# Patient Record
Sex: MALE
[De-identification: names, ages, dates, MRNs, and addresses within clinical notes are randomized per-mention and may not be internally consistent; named-entity substitution may affect disease eponyms.]

---
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
## 2020-08-05 ENCOUNTER — HOSPITAL ENCOUNTER (EMERGENCY)
Dept: HOSPITAL 97 - ER | Age: 20
Discharge: HOME | End: 2020-08-05
Payer: COMMERCIAL

## 2020-08-05 VITALS — OXYGEN SATURATION: 97 % | DIASTOLIC BLOOD PRESSURE: 56 MMHG | SYSTOLIC BLOOD PRESSURE: 96 MMHG

## 2020-08-05 VITALS — TEMPERATURE: 98.5 F

## 2020-08-05 DIAGNOSIS — Z88.1: ICD-10-CM

## 2020-08-05 DIAGNOSIS — G89.18: Primary | ICD-10-CM

## 2020-08-05 DIAGNOSIS — Z98.890: ICD-10-CM

## 2020-08-05 PROCEDURE — 96372 THER/PROPH/DIAG INJ SC/IM: CPT

## 2020-08-05 PROCEDURE — 99283 EMERGENCY DEPT VISIT LOW MDM: CPT

## 2020-08-05 NOTE — XMS REPORT
Continuity of Care Document

                            Created on:2020



Patient:ABA KRAUSE

Sex:Male

:2000

External Reference #:[id number]





Demographics







                          Address                   [address] [place] [id number]

 

                          Home Phone                [phone]

 

                          Preferred Language        en-US

 

                          Marital Status            Unknown

 

                          Anglican Affiliation     Unknown

 

                          Race                      Unknown

 

                          Ethnic Group              Unknown









Author







                          Organization              [employer]









Care Team Providers







                    Name                Role                Phone

 

                    [employer] [facility] Unavailable         Un

available









Problems







          Problem   Status    Onset     Classification Date      Comments  Sourc

e



                              Date                Reported            



                                                                      



                                                                      

 

          RIGHT BICEP Active                                   [facility]



                              9                                       [facility]

r



                                                                      [place]



                                                                      

 

          RT        Active                                   [facility]



          KNEE/HIP/CONNOR           9                                       [place]

d



          D                                                           \[facility]\""ve

r



                                                                      [place]



                                                                      







Medications







                                        No Data Provided for This Section







Allergies, Adverse Reactions, Alerts







                                        No Known Medication Allergies







Immunizations







                                        No Data Provided for This Section







Results







                                        No Data Provided for This Section







Pathology Reports







                                        No Data Provided for This Section







Diagnostic Reports







                                        No Data Provided for This Section







Consultation Notes







                                        No Data Provided for This Section







Discharge Summaries







                                        No Data Provided for This Section







History and Physicals







                                        No Data Provided for This Section







Vital Signs







                                        No Data Provided for This Section







Encounters







       Location Location Encounter Encounter Reason Attending ADM    DC     Stat

 Source



              Details Type   Number For    Provider Date   Date          



                                   Visit                              



                                                                      



                                                                      



                                                                      

 

       [facility]           OP Therapy [id number]        Jose Francisco Maverick      

    [facility]        Patients                      /2019         [facility]



       [place]                                                           [place]



                                                                      



                                                                      

 

       [facility]           OP Therapy [id number]        Jose Francisco Maverick 09/16  10/16     

    [facility]        Patients                      /2019         [facility]                                                           [place]



                                                                      



                                                                      







Procedures







                                        No Data Provided for This Section







Assessment and Plan







                                        No Data Provided for This Section







Plan of Care







                                        No Data Provided for This Section







Social History







                    Social History      Date                Source



                                                            

 

                    No data available for this 10/16/2019          [facility]







Family History







                                        No Data Provided for This Section







Advance Directives







                                        No Data Provided for This Section







Functional Status







                                        No Data Provided for This Section

## 2020-08-05 NOTE — XMS REPORT
Continuity of Care Document

                            Created on:2020



Patient:ABA KRAUSE

Sex:Male

:2000

External Reference #:[id number]





Demographics







                          Address                   [address] [place] [address]

 

                          Home Phone                [phone]

 

                          Work Phone                [phone]

 

                          Email Address             [email or web address]

 

                          Preferred Language        en

 

                          Marital Status            Unknown

 

                          Latter-day Affiliation     Unknown

 

                          Race                      Unknown

 

                          Additional Race(s)        Unavailable



                                                    Unavailable



                                                    Other



                                                    Unavailable

 

                          Ethnic Group              Unknown









Author







                          Organization              [facility]

t

 

                          Address                   [address]



                                                    [place] [address]

 

                          Phone                     [phone]









Support







                Name            Relationship    Address         Phone

 

                ABA KRAUSE   Unavailable     .               [phone]



                                                .               



                                                [place] [address] 

 

                ABA KRAUSE   Unavailable     .               [phone]



                                                .               



                                                [place] [address] 

 

                JACOB KRAUSE  Unavailable     [address] [facility]    [phone]



                                                [place] [address] 









Care Team Providers







                    Name                Role                Phone

 

                    ASHER TEMPLE M.D.   Attending Clinician Unavailable

 

                    Asher Temple Attending Clinician [phone]

 

                    DR RAMYA         Attending Clinician Unavailable

 

                    DR RAMYA         Admitting Clinician Unavailable









Payers







           Payer Name Policy Type Policy Number Effective Date Expiration Date S

ource







Problems







       Condition Condition Condition Status Onset  Resolution Last   Treating Co

mments 

Source



       Name   Details Category        Date   Date   Treatment Clinician        



                                                 Date                 

 

       RIGHT         Diagnosis Active 2019               Mem

[facility]



       BICEP                       9-03          16:13:00               l



                RIGHT               08:00:                             [facility]



              BICEP                00                                 



                                                                      



                                                                      



              Active                                                  



                                                                      



                                                                      



              2019                                                  



                                                                      



                                                                      



                                                                      



                                                                      



                                                                      



                                                                      



                                                                      



                                                                      



                     [facility]                                                    



                                                                      



                                                                      

 

       RT            Diagnosis Active 2019               Mem

oria



       KNEE/HIP/Q                      -29          15:01:00               l



       [facility]      RT                 16:12:                             [place]



              KNEE/HIP/Q               00                                 



              [facility]                                                     



                                                                      



                                                                      



              Active                                                  



                                                                      



                                                                      



              2019                                                  



                                                                      



                                                                      



                                                                      



                                                                      



                                                                      



                                                                      



                                                                      



                                                                      



                     [facility]                                                    



                                                                      



                                                                      

 

       Biceps Biceps Problem Active                                    [facility]



       tendinitis tendinitis                                                  it

y of



       of right of right                                                  [place]



       upper  upper                                                   Physici



       extremity extremity                                                  ans

 

       Right  Right  Problem Active                                    [facility]



       elbow pain elbow pain                                                  it

y of



                                                                      [place]



                                                                      Physici



                                                                      ans







Allergies, Adverse Reactions, Alerts







       Allergy Allergy Status Severity Reaction(s) Onset  Inactive Treating Comm

ents 

Source



       Name   Type                        Date   Date   Clinician        

 

       No Known DA     Active U                                   [facility]



       Allergie                                                     [place]



       s                                  00:00:                      d



                                          00                          Medical



                                                                      Center

 

       amoxicil DA     Active SV            -                      [facility]



       dheeraj                                6-25                        [place]



                                          00:00:                      d



                                          00                          Medical



                                                                      Center







Social History







           Social Habit Start Date Stop Date  Quantity   Comments   Source

 

           Social History 2019-10-16 2019-10-16                       [facility] SHANTI valladares



                      04:59:00   04:59:00                         







Medications







       Ordered Filled Start  Stop   Current Ordering Indication Dosage Frequency

 Signature

                    Comments            Components          Source



     Medication Medication Date Date Medication? Clinician                (SIG) 

          



     Name Name                                                   

 

     methylPREDN methylPREDN       Yes  ASHER TEMPLE                Take as 

          [facility]



     ISolone 4 ISolone 4 8-29           M.D.                direct on           

ity of



     MG Oral MG Oral 00:00:                               the            [place]



     Tablet Tablet 00                                 package.           Physici



     Therapy Therapy                                    QTY 1 x 21           ans



     Pack Pack                                    tablet           



                                                  pack           

 

     Diclofenac Diclofenac       Yes  ASHER TEMPLE           QD   APPLY TO  

         [facility]



     Sodium 1 % Sodium 1 % 1-24           M.D.                LOWER           it

y of



     Transdermal Transdermal 00:00:                               EXTREMITIE    

       [place]



     Gel  Gel  00                                 S, 4 GM OF           Physici



                                                  GEL TO           ans



                                                  AFFECTED           



                                                  AREA 4           



                                                  TIMES           



                                                  DAILY.  DO           



                                                  NOT APPLY           



                                                  MORE THAN           



                                                  16 GM           



                                                  DAILY TO           



                                                  ANY ONE           



                                                  AFFECTED           



                                                  JOINT.           

 

     Meloxicam Meloxicam       Yes  ASHER TEMPLE      1         TAKE 1      

     [facility]



     15 MG Oral 15 MG Oral 1-24           M.D.                TABLET           i

ty of



     Tablet Tablet 00:00:                               DAILY AS           [place]



               00                                 NEEDED.           Physici



                                                                 ans







Immunizations







           Ordered Immunization Filled Immunization Date       Status     Commen

ts   Source



           Name       Name                                        

 

           Varivax 1350            2013 Completed             University o

f



           PFU/0.5ML             00:00:00                         [place] [facility]

ns



           Subcutaneous                                             



           Injectable                                             

 

           Boostrix 5-2.5-18.5            2013 Completed             Unive

rsity of



           Intramuscular            00:00:00                         [place] [facility]

cians



           Suspension                                             

 

           Meningococcal, MCV4,            2013 Completed             Univ

ersity of



           unspecified            00:00:00                         [place] [facility]

ans



           conjugate                                              



           formulation(groups                                             



           A, C, Y and W-135)                                             

 

           Hepatitis B,            2000 Completed             University o

f



           pediatric/adolescent            00:00:00                         [place]

[facility]



           dosage                                                 







Procedures







                Procedure       Date / Time Performed Performing Clinician Sourc

e

 

                [U] XRAY HIP    2020 00:00:00                 [facility]

f [place]



                UNILATERAL MIN 2 VWS                                 Physicians



                RIGHT [id number]                                     

 

                [U] XRAY ELBOW MIN 3 2019 00:00:00                 [facility]

ity of [place]



                VWS RIGHT [id number]                                 Physicians

 

                [U] XRAY SHOULDER MIN 2019 00:00:00                 Univer

[facility] [place]



                2 VWS RIGHT [address]                                 Physicians

 

                [U] XRAY HIP    2019 00:00:00                 [facility]



                UNILATERAL MIN 2 VWS                                 Physicians



                RIGHT [id number]                                     







Encounters







        Start   End     Encounter Admission Attending Care    Care    Encounter 

Source



        Date/Time Date/Time Type    Type    Clinicians Facility Department ID   

   

 

        2020 Appointmen         ABRIL TEMPLE     Orthopedics [id number]

[id number] [facility]



        09:45:00 09:45:00 t; ASHER TEMPLE M.D.         - Guaynabosushil Lopez M.D.                                         [place]



                                                                        Physici



                                                                        ans

 

        2019 Appointmen         ABRIL TEMPLE     [facility]     [id number]

2 Univers



        15:45:00 15:45:00 t; ASHER TEMPLE M.D. i ty of EVAN, M.D.                                         [place]



                                                                        Physici



                                                                        ans

 

        2019 Appointmen         ABRIL TEMPLE     Orthopedics 589

[id number] [facility]



        12:45:00 12:45:00 t; ASHER TEMPLE M.D.         - [place]ayn[place]ushil Lopez M.D.                                         [place]



                                                                        Physici



                                                                        ans

 

        2019-10-17 2019-10-17 Appointmen         ABRIL TEMPLE     [facility]     [id number]

8 Univers



        15:45:00 15:45:00 t; ASHER TEMPLE M.D. i ty of EVAN, M.D.                                         [place]



                                                                        Physici



                                                                        ans

 

        2019 2019-10-15 Outpatient         Asher Temple [id number]. [id number].

1. [id number] 



        15:50:00 23:59:00                 [place] [id number]    

  



                                                [phone] [phone]            

 

        2019 Appointmen         ABRIL TEMPLE     Orthopedics 562

[id number] [facility]



        14:45:00 14:45:00 t; ASHER TEMPLE M.D.         - Claire Lopez M.D.                                         [place]



                                                                        Physici



                                                                        ans

 

        2019 Appointmen         ABRIL TEMPLE     Orthopedics 515

[id number] [facility]



        16:00:00 16:00:00 t; ASHER TEMPLE M.D.         at [facility]



                        SIMEON STERLING                                         [facility]

 

        2019 Appointmen         NATHALIA  [facility]     [id number]

9 Univers



        14:45:00 14:45:00 t; ASHER TEMPLE M.D.                         i

ty of



                        SIMEON STERLING                                         [facility]

 

        2019 Outpatient         Asher Temple [id number]. [id number].

1. [id number] 



        16:00:00 23:59:00                 [place] [id number]    

  



                                                [phone] 5.57            

 

        2019 Appointmen         ABRIL TEMPLE     Orthopedics 489

[address] [facility]



        15:30:00 15:30:00 t; ASHER TEMPLE M.D.         at [facility]



                        SIMEON STERLING                                         [facility]

 

        2018-10-18 2018-10-18 Outpatient MAICOL BUI [facility]     TRAVISASC 1000

[id number] [place]



        04:34:00 07:15:00                 [facility]







Results







           Test Description Test Time  Test Comments Results    Result     [facility]

e



                                                       Comments   

 

           [U] XRAY   2019             Images acquired,            Universi

ty of



           SHOULDER MIN 2 9                     not reported on            [facility] RIGHT [id number] 15:17:00              this accession            Physi

cians



                                            number.               

 

           SURG       2019             -------------------            



                      8                     -------------------            



                      16:21:00              -------------------            



                                            -------------------            



                                            ----------------RUN            



                                            DATE: 19            



                                                    [facility] -            



                                            LAB *LIVE*            



                                                 PAGE 1   RUN            



                                            TIME: 1621            



                                                                  



                                            Specimen Inquiry            



                                                            RUN            



                                            USER: INTERFACE            



                                                                  



                                                                  



                                                                  



                                            -------------------            



                                            -------------------            



                                            -------------------            



                                            -------------------            



                                            ----------------JYOTSNA            



                                            IENT: ABA KRAUSE            



                                                                  



                                            ACCT #:               



                                            [id number] LOC:            



                                            L.2S       U #:            



                                            [id number]            



                                                                  



                                                     AGE/SX:            



                                            18/M         ROOM:            



                                            [facility] [place]     RE/27/19[facility] DR:            



                                            Renato Coronel III            



                                             :    00            



                                              BED:  1             



                                            DIS:                  



                                                                  



                                                        STATUS:            



                                            ADM Nikolas      TLOC:            



                                                                  



                                            -------------------            



                                            -------------------            



                                            -------------------            



                                            -------------------            



                                            ----------------            



                                            SPEC #:               



                                            [id number]            



                                            RECD:             



                                                STATUS:  SHARLA            



                                                    REQ #:            



                                            [id number]              



                                                                  



                                            KAMAR:             



                                                [facility] DR:            



                                            Renato Coronel III, MD            



                                              ENTERED:            



                                                SP            



                                            TYPE: SURG            



                                             OTHR DR:             



                                            Kel Cortes MD            



                                                      ORDERED:            



                                            SURG PATH LVL 1,            



                                            SURG PATH LVL 3/3            



                                                                  



                                                                  



                                                  COPIES TO:            



                                            Kel Cortes MD            



                                             [address]            



                                            [place], [place]            



                                            [address] [phone]            



                                            Renato Coronel III, MD            



                                            [address] [facility]   [address] [place] [address]            



                                             [phone]            



                                            HISTOLOGY:   TISSUE            



                                                     ID    BLK            



                                             PCS MAR[place] LEV            



                                            PROCEDURE             



                                            DISPOSITION            



                                            _______________            



                                            ____ ______ ___ ___            



                                            ___ _______________            



                                            ___________            



                                            TONSIL, NOS     A            



                                               1              1            



                                                                  



                                            TONSIL, NOS     B            



                                               1              1            



                                                                  



                                            NASAL SEPTUM, N C            



                                               1              1            



                                                                  



                                            NASAL TURBINATE D            



                                               1              1            



                                                                  



                                            PROCEDURES: SURG            



                                            PATH LVL 1            



                                            ()            



                                                   SURG PATH            



                                            LVL 3                 



                                            ()            



                                            TISSUES:              



                                            A. TONSIL, NOS -            



                                            RIGHT TONSIL            



                                             B. TONSIL, NOS -            



                                            LEFT TONSIL            



                                            C. NASAL SEPTUM,            



                                            NOS - SEPTUM            



                                             D. NASAL             



                                            TURBINATE, NOS -            



                                            TURBINATES,            



                                            SUBMUCOUS             



                                            CLINICAL HISTORY            



                                            TONSIL HYPERTROPHY            



                                            -J35.1; NASAL            



                                            SEPTAL DEFORMITY            



                                            -J34.2                



                                            CPT CODES    CPT            



                                            CODE(S): 88304X3            



                                            , 57826     ,            



                                                 ,           ,            



                                                     ,            



                                              ,         FINAL            



                                            DIAGNOSIS    A.            



                                            Tonsil, right,            



                                            tonsillectomy:            



                                             CHRONIC              



                                            TONSILLITIS            



                                                                  



                                                   ** CONTINUED            



                                            ON NEXT PAGE **            



                                            -------------------            



                                            -------------------            



                                            -------------------            



                                            -------------------            



                                            ----------------RUN            



                                            DATE: 19            



                                                    [facility] -            



                                            LAB *LIVE*            



                                                 PAGE 2   RUN            



                                            TIME: 1621            



                                                                  



                                            Specimen Inquiry            



                                                            RUN            



                                            USER: INTERFACE            



                                                                  



                                                                  



                                                                  



                                            -------------------            



                                            -------------------            



                                            -------------------            



                                            -------------------            



                                            ----------------SHAHBAZ MILIAN #: PMC:[id number]            



                                               PATIENT:            



                                            ABA KRAUSE            



                                                                  



                                            #[id number]            



                                            (Continued)--------            



                                            -------------------            



                                            -------------------            



                                            -------------------            



                                            -------------------            



                                            --------              



                                            FINAL DIAGNOSIS            



                                                  (Continued)            



                                             B.  Tonsil, left,            



                                            tonsillectomy:            



                                             CHRONIC              



                                            TONSILLITIS            



                                            C.  Nasal septum,            



                                            septoplasty:            



                                            UNREMARKABLE            



                                            FRAGMENTS OF BONE            



                                            AND CARTILAGE            



                                            (GROSS DIAGNOSIS            



                                            ONLY)       D.            



                                            Turbinates,            



                                            excision:             



                                            MILD CHRONIC            



                                            INFLAMMATION            



                                            GROSS DESCRIPTION            



                                             A.  Right tonsil.            



                                            Received in            



                                            formalin is a            



                                            fragment of            



                                            tan-brown soft            



                                            tissue,   partially            



                                            covered by pink-tan            



                                            mucosa, 2.5 x 1.7 x            



                                            0.8 cm.  The cut            



                                            surface is   soft,            



                                            pink-tan with            



                                            crypts.  No gross            



                                            lesion is             



                                            identified.            



                                            Representative            



                                            section submitted            



                                            as A.        B.            



                                            Left tonsil.            



                                            Received in            



                                            formalin is a            



                                            fragment of            



                                            tan-brown soft            



                                            tissue,   partially            



                                            covered by pink-tan            



                                            mucosa, 3.0 x 2.0 x            



                                            0.8 cm.  The cut            



                                            surface is   soft,            



                                            pink-tan with            



                                            crypts.  No gross            



                                            lesion is             



                                            identified.            



                                            Representative            



                                            section submitted            



                                            as B.       C.            



                                            Septum.  Received            



                                            in formalin are            



                                            multiple irregular            



                                            fragments of            



                                            cartilage   and            



                                            bones, 3.5 x 3.0 x            



                                            0.5 cm in             



                                            aggregate.  The            



                                            specimen is for            



                                            gross                 



                                            identification only            



                                            and is                



                                            photographed.            



                                            D.  Turbinates.            



                                            Received in            



                                            formalin are            



                                            irregular fragments            



                                            of tan-brown soft            



                                            tissue admixed with            



                                            dark brown blood            



                                            clots, 2.5 x 1.0 x            



                                            0.3 cm in             



                                            aggregate.            



                                            Representative            



                                            sections submitted            



                                            as D.  ba/nr            



                                            Grossing performed            



                                            at [facility],            



                                            [address] [place] [address], [address]            



                                            [address],    [place] [address].            



                                            Medical Director:            



                                            Lucas Lorenz M.D.                  



                                            MICROSCOPIC            



                                            DESCRIPTION    A.            



                                            Right tonsil.            



                                            Sections              



                                            demonstrate            



                                            tonsillar tissue            



                                            with lymphoid            



                                            hyperplasia.            



                                            Associated squamous            



                                            mucosa demonstrates            



                                            acute and chronic            



                                            inflammation.  No            



                                            dysplasia or            



                                            malignancy is            



                                            identified.            



                                            B.  Left tonsil.            



                                            Sections              



                                            demonstrate            



                                            tonsillar tissue            



                                            with lymphoid            



                                            hyperplasia.            



                                            Associated squamous            



                                            mucosa demonstrates            



                                            acute and chronic            



                                            inflammation.  No            



                                            dysplasia or            



                                            malignancy is            



                                            identified.            



                                            C.  Septum. No            



                                            sections submitted.            



                                             Gross diagnosis            



                                            only.       D.            



                                            Turbinates.            



                                            Sections              



                                            demonstrate            



                                            respiratory mucosa            



                                            with underlying            



                                            glands   and mild            



                                            chronic               



                                            inflammation.  No            



                                            dysplasia or            



                                            malignancy is            



                                            identified.            



                                                                  



                                               ** CONTINUED ON            



                                            NEXT PAGE **            



                                            -------------------            



                                            -------------------            



                                            -------------------            



                                            -------------------            



                                            ----------------RUN            



                                            DATE: 19            



                                                    [facility] -            



                                            LAB *LIVE*            



                                                 PAGE 3   RUN            



                                            TIME: 1621            



                                                                  



                                            Specimen Inquiry            



                                                            RUN            



                                            USER: INTERFACE            



                                                                  



                                                                  



                                                                  



                                            -------------------            



                                            -------------------            



                                            -------------------            



                                            -------------------            



                                            ----------------SHAHBAZ MILIAN #: PMC:[id number]            



                                               PATIENT:            



                                            ABA KRAUSE            



                                                                  



                                            #[id number]            



                                            (Continued)--------            



                                            -------------------            



                                            -------------------            



                                            -------------------            



                                            -------------------            



                                            --------              



                                            -------------------            



                                            -------------------            



                                            -------------------            



                                            -------------------            



                                            ----------------            



                                            Signed SIGNATURE ON            



                                            FILE                  



                                                                  



                                            Geovanni Harvey JEFF            



                                            19 1621            



                                            -------------------            



                                            -------------------            



                                            -------------------            



                                            -------------------            



                                            ----------------            



                                                                  



                                                                  



                                                                  



                                                             **            



                                            END OF REPORT **            









                    PROTHROMBIN TIME    2019 06:59:00 









                      Test Item  Value      Reference Range Interpretation Anabela amezcua









             PT PATIENT (test code = PTP) 12.1 SECONDS 9.3-12.9     N           

 

 

             [employer]ATIO (test code = INR) 1.05 INR Unit 0.8-1.2 

     N            



THROMBOPLASTIN TIME [id number] 06:59:00





             Test Item    Value        Reference Range Interpretation Comments

 

             THROMBOPLASTIN TIME PARTIAL 30.4 SECONDS 26-35        N            



             (test code = PTT)                                        



CBC W/AUTO [id number] 06:20:00





             Test Item    Value        Reference Range Interpretation Comments

 

             WHITE BLOOD CELL (test code = 5.5 K/mm3    3.5-11.0     N          

  



             WBC)                                                

 

             RED BLOOD CELL (test code = RBC) 4.71 M/mm3   4.70-6.10    N       

     

 

             HEMOGLOBIN (test code = HGB) 15.3 G/DL    12.3-15.9    N           

 

 

             HEMATOCRIT (test code = HCT) 43.2 %       35.8-46.7    N           

 

 

             MEAN CELL VOLUME (test code = 91.7 Fl      86.3-98.9    N          

  



             MCV)                                                

 

             MEAN CELL HGB (test code = MCH) 32.5 pg      28.9-34.4    N        

    

 

             MEAN CELL HGB CONCETRATION (test 35.4 G/DL    32.1-34.5    H       

     



             code = MCHC)                                        

 

             RED CELL DISTRIBUTION WIDTH (test 12.8 SD      11.5-14.5    N      

      



             code = RDW)                                         

 

             PLATELET COUNT (test code = PLT) 223.0 K/mm3  150-450      N       

     

 

             MEAN PLATELET VOLUME (test code = 10.10 fL     7.0-9.6      H      

      



             MPV)                                                

 

             NEUTROPHIL % (test code = NT%) 40.2 %       24.0-85.0    N         

   

 

             LYMPHOCYTE % (test code = LY%) 44.6 %       20.5-51.1    N         

   

 

             MONOCYTE % (test code = MO%) 12.1 %       1.7-9.3      H           

 

 

             EOSINOPHIL % (test code = EO%) 2.0 %        0.0-6.0      N         

   

 

             BASOPHIL % (test code = BA%) 1.1 %        0.0-2.0      N           

 

 

             NEUTROPHIL # (test code = NT#) 2.23 K/mm3   1.8-7.6      N         

   

 

             LYMPHOCYTE # (test code = LY#) 2.5 K/mm3    0.6-3.0      N         

   

 

             MONOCYTE # (test code = MO#) 0.7 K/mm3    0.2-1.5      N           

 

 

             EOSINOPHIL # (test code = EO#) 0.1 K/mm3    0.0-0.4      N         

   

 

             BASOPHIL # (test code = BA#) 0.1 K/mm3    0.0-0.2      N           

 

 

             MANUAL DIFF REQUIRED (test code = NO DIFF/SCN  CRITERIA            

      



             MDIFF)                                              



[U] XRAY KNEE 1 OR 2 VWS RIGHT [id number] 15:21:00Images acquired, not 
reported on this accession number.[facility]

## 2020-08-05 NOTE — EDPHYS
Physician Documentation                                                                           

 [facility]                                                                 

Name: Qamar Lopez IV                                                                             

Age: 19 yrs                                                                                       

Sex: Male                                                                                         

: 2000                                                                                   

MRN: [id number]                                                                                   

Arrival Date: 2020                                                                          

Time: 00:59                                                                                       

Account#: [id number]                                                                            

Bed 15                                                                                            

Private MD: Kel Cortes ED Physician David England                                                                         

HPI:                                                                                              

                                                                                             

01:48 This 19 yrs old  Male presents to ER via Ambulatory with complaints of Post     jr8 

      Surgical Pain.                                                                              

01:48 The complaints affect the right wrist diffusely. Onset: The symptoms/episode            jr8 

      began/occurred acutely, today. Modifying factors: The symptoms are alleviated by            

      nothing, the symptoms are aggravated by movement. Associated signs and symptoms: The        

      patient has no apparent associated signs or symptoms. The patient has not experienced       

      similar symptoms in the past. The patient has been recently seen by a physician:.           

      Patient had recent carpal tunnel release. Stated that today he had brace on wrong.          

      Started to have pain at surgical site that has now intensified and that his OTC             

      medicines and prescriptive medicines are not working .                                      

                                                                                                  

Historical:                                                                                       

- Allergies:                                                                                      

01:15 Amoxicillin;                                                                            mt2 

- Home Meds:                                                                                      

01:20 Lyrica Oral 1 cap 3 times per day [Active]; Wellbutrin 75 mg Oral tab 1 tab DAILY       mt2 

      [Active]; hydroxyzine HCl 25 mg Oral tab 1 tab PRN for Anxiety [Active];                    

      hydrocodone-acetaminophen 5-325 mg Oral tab 1 tab every 6 hours for Pain [Active];          

- PSHx:                                                                                           

01:15 Tonsillectomy;                                                                          mt2 

                                                                                                  

- Immunization history:: Adult Immunizations up to date.                                          

- Social history:: Smoking status: Patient denies any tobacco usage or history of.                

                                                                                                  

                                                                                                  

ROS:                                                                                              

01:48 Eyes: Negative for injury, pain, redness, and discharge, ENT: Negative for injury,      jr8 

      pain, and discharge, Neck: Negative for injury, pain, and swelling, Cardiovascular:         

      Negative for chest pain, palpitations, and edema, Respiratory: Negative for shortness       

      of breath, cough, wheezing, and pleuritic chest pain, Abdomen/GI: Negative for              

      abdominal pain, nausea, vomiting, diarrhea, and constipation, Back: Negative for injury     

      and pain, Skin: Negative for injury, rash, and discoloration, Neuro: Negative for           

      headache, weakness, numbness, tingling, and seizure.                                        

01:48 MS/extremity: Positive for pain, tenderness, of the right hand.                             

                                                                                                  

Exam:                                                                                             

01:48 Cardiovascular:  Regular rate and rhythm with a normal S1 and S2.  No gallops, murmurs, jr8 

      or rubs.  Normal PMI, no JVD.  No pulse deficits. Respiratory:  Lungs have equal breath     

      sounds bilaterally, clear to auscultation and percussion.  No rales, rhonchi or wheezes     

      noted.  No increased work of breathing, no retractions or nasal flaring. Skin:  Warm,       

      dry with normal turgor.  Normal color with no rashes, no lesions, and no evidence of        

      cellulitis. Neuro:  Awake and alert, GCS 15, oriented to person, place, time, and           

      situation.  Cranial nerves II-XII grossly intact.  Motor strength 5/5 in all                

      extremities.  Sensory grossly intact.  Cerebellar exam normal.  Normal gait.                

01:48 Constitutional: The patient appears alert, awake, anxious, in obvious pain,                 

      uncomfortable.                                                                              

01:48 Musculoskeletal/extremity: Extremities: grossly normal except: noted in the right           

      wrist/hand: Patient has approximately 2.5 cm surgical incision noted to right wrist         

      region from carpal tunnel release. Sutures in place. No dehiscence noted. No erythema       

      or discharge noted. Moderate pain to palpation , ROM: intact in all extremities,            

      Circulation is intact in all extremities. Sensation intact.                                 

                                                                                                  

Vital Signs:                                                                                      

01:11  / 70; Pulse 118; Resp 16; Temp 98.5; Pulse Ox 100% ; Weight 56.7 kg; Pain 10/10; mt2 

02:00 BP 96 / 56; Pulse 81; Resp 16; Pulse Ox 97% ; Pain 3/10;                                mt2 

                                                                                                  

MDM:                                                                                              

01:12 Patient medically screened.                                                             jr8 

01:48 Data reviewed: vital signs, nurses notes, and as a result, I will discharge patient.    jr8 

      Data interpreted: Pulse oximetry: on room air is 97 %. Interpretation: normal.              

      Counseling: I had a detailed discussion with the patient and/or guardian regarding: the     

      historical points, exam findings, and any diagnostic results supporting the                 

      discharge/admit diagnosis, the need for outpatient follow up, a orthopedic surgeon, to      

      return to the emergency department if symptoms worsen or persist or if there are any        

      questions or concerns that arise at home. ED course: Discussed with patient that he is      

      likely having some breakthrough pain. Patient feeling better. No other acute findings       

      on exam suggestive of infection or other post surgical complications that would warrant     

      emergent admission and consultation with orthopedics. Will have him f/u up with Dr. Farrar tomorrow. Patient good with plan .                                                  

                                                                                                  

Administered Medications:                                                                         

01:39 Drug: Zofran (Ondansetron) 4 mg Route: PO;                                              mt2 

02:00 Follow up: Response: No adverse reaction; Pain is decreased                             mt2 

01:39 Drug: TORadol - Ketorolac 15 mg Route: IM; Site: left deltoid;                          mt2 

02:00 Follow up: Response: No adverse reaction; Pain is decreased                             mt2 

01:39 Drug: Dilaudid 1 mg Route: IM; Site: left deltoid;                                      mt2 

02:00 Follow up: Response: No adverse reaction; Pain is decreased                             mt2 

                                                                                                  

                                                                                                  

Disposition:                                                                                      

03:10 Co-signature as Attending Physician, David England MD.                                    rn  

                                                                                                  

Disposition:                                                                                      

20 02:25 Discharged to Home. Impression: Other acute postprocedural pain.                   

- Condition is Stable.                                                                            

- Discharge Instructions: Pain Medicine Instructions.                                             

                                                                                                  

- Medication Reconciliation Form, Thank You Letter, Antibiotic Education, Prescription            

  Opioid Use form.                                                                                

- Follow up: Naif Arthur MD; When: Tomorrow; Reason: Recheck today's complaints,             

  Continuance of care, Re-evaluation by your physician.                                           

- Problem is new.                                                                                 

- Symptoms have improved.                                                                         

                                                                                                  

                                                                                                  

                                                                                                  

Signatures:                                                                                       

David England MD MD rn Roszak, Josh, PA PA   jr8                                                  

Jerri Espitia RN                    RN   mt2                                                  

                                                                                                  

Corrections: (The following items were deleted from the chart)                                    

02:33 02:25 2020 02:25 Discharged to Home. Impression: Other acute postprocedural pain. mt2 

      Condition is Stable. Forms are Medication Reconciliation Form, Thank You Letter,            

      Antibiotic Education, Prescription Opioid Use. Follow up: Naif Arthur; When:             

      Tomorrow; Reason: Recheck today's complaints, Continuance of care, Re-evaluation by         

      your physician. Problem is new. Symptoms have improved. jr8                                 

                                                                                                  

**************************************************************************************************

## 2020-08-05 NOTE — ER
Nurse's Notes                                                                                     

 [facility]                                                                 

Name: Qamar Lopez IV                                                                             

Age: 19 yrs                                                                                       

Sex: Male                                                                                         

: 2000                                                                                   

MRN: [id number]                                                                                   

Arrival Date: 2020                                                                          

Time: 00:59                                                                                       

Account#: [id number]                                                                            

Bed 15                                                                                            

Private MD: Kel Cortes                                                                         

Diagnosis: Other acute postprocedural pain                                                        

                                                                                                  

Presentation:                                                                                     

                                                                                             

01:11 Chief complaint: Patient states: PER PT S/P CARPAL TUNNEL SX ON RIGHT WRIST. PAIN HAS   mt2 

      INCREASED AND IT IS INTOLERABLE. NOS/S OF REDNESS OR SWELLING. Coronavirus screen:          

      Client denies travel out of the [place] in the last 14 days. At this time, the client does     

      not indicate any symptoms associated with coronavirus-19. Ebola Screen: No symptoms or      

      risks identified at this time. Initial Sepsis Screen: Does the patient meet any 2           

      criteria? No. Patient's initial sepsis screen is negative. Does the patient have a          

      suspected source of infection? No. Patient's initial sepsis screen is negative. Risk        

      Assessment: Do you want to hurt yourself or someone else?. Onset of symptoms was 2020.                                                                                   

01:11 Method Of Arrival: Ambulatory                                                           mt2 

01:11 Acuity: ANKITA 4                                                                           mt2 

                                                                                                  

Triage Assessment:                                                                                

01:15 General: Appears uncomfortable, Behavior is cooperative. Pain: Complains of pain in     mt2 

      right hand Pain currently is 10 out of 10 on a pain scale. Musculoskeletal: Denies          

      numbness in, right hand pain in, right hand.                                                

                                                                                                  

Historical:                                                                                       

- Allergies:                                                                                      

01:15 Amoxicillin;                                                                            mt2 

- Home Meds:                                                                                      

01:20 Lyrica Oral 1 cap 3 times per day [Active]; Wellbutrin 75 mg Oral tab 1 tab DAILY       mt2 

      [Active]; hydroxyzine HCl 25 mg Oral tab 1 tab PRN for Anxiety [Active];                    

      hydrocodone-acetaminophen 5-325 mg Oral tab 1 tab every 6 hours for Pain [Active];          

- PSHx:                                                                                           

01:15 Tonsillectomy;                                                                          mt2 

                                                                                                  

- Immunization history:: Adult Immunizations up to date.                                          

- Social history:: Smoking status: Patient denies any tobacco usage or history of.                

                                                                                                  

                                                                                                  

Screenin:16 Abuse screen: Denies threats or abuse. Nutritional screening: No deficits noted.        mt2 

      Tuberculosis screening: No symptoms or risk factors identified. Fall Risk None              

      identified.                                                                                 

                                                                                                  

Assessment:                                                                                       

02:00 Reassessment: Patient and/or family updated on plan of care and expected duration. Pain mt2 

      level reassessed. Patient is alert, oriented x 3, equal unlabored respirations, skin        

      warm/dry/pink. General: Appears comfortable, Behavior is cooperative. Pain: Complains       

      of pain in right hand Pain currently is 3 out of 10 on a pain scale. Quality of pain is     

      described as aching.                                                                        

                                                                                                  

Vital Signs:                                                                                      

01:11  / 70; Pulse 118; Resp 16; Temp 98.5; Pulse Ox 100% ; Weight 56.7 kg; Pain 10/10; mt2 

02:00 BP 96 / 56; Pulse 81; Resp 16; Pulse Ox 97% ; Pain 3/10;                                mt2 

                                                                                                  

ED Course:                                                                                        

00:59 Patient arrived in ED.                                                                  es  

01:00 Kel Cortes MD is Private Physician.                                                 es  

01:03 Jerri Espitia, RN is Primary Nurse.                                                  mt2 

01:12 [facility]                                                               jr8 

01:12 David England MD is Attending Physician.                                                jr8 

01:14 Triage completed.                                                                       mt2 

01:16 Arm band placed on right wrist.                                                         mt2 

01:16 Bed in low position. Call light in reach. Side rails up X 1.                            mt2 

02:24 Naif Arthur MD is Referral Physician.                                              jr8 

02:27 No provider procedures requiring assistance completed. Patient did not have IV access   mt2 

      during this emergency room visit.                                                           

                                                                                                  

Administered Medications:                                                                         

01:39 Drug: Zofran (Ondansetron) 4 mg Route: PO;                                              mt2 

02:00 Follow up: Response: No adverse reaction; Pain is decreased                             mt2 

01:39 Drug: TORadol - Ketorolac 15 mg Route: IM; Site: left deltoid;                          mt2 

02:00 Follow up: Response: No adverse reaction; Pain is decreased                             mt2 

01:39 Drug: Dilaudid 1 mg Route: IM; Site: left deltoid;                                      mt2 

02:00 Follow up: Response: No adverse reaction; Pain is decreased                             mt2 

                                                                                                  

                                                                                                  

Outcome:                                                                                          

02:25 Discharge ordered by MD.                                                                jr8 

02:27 Discharged to home ambulatory.                                                          mt2 

02:27 Condition: improved                                                                         

02:27 Discharge instructions given to patient, Instructed on discharge instructions, follow       

      up and referral plans. medication usage, Demonstrated understanding of instructions,        

      follow-up care, medications.                                                                

02:33 Patient left the ED.                                                                    mt2 

                                                                                                  

Signatures:                                                                                       

Olga Salas Josh, PA                        PA   jr8                                                  

Jerri Espitia RN                    RN   mt2                                                  

                                                                                                  

**************************************************************************************************

## 2024-08-01 ENCOUNTER — HOSPITAL ENCOUNTER (EMERGENCY)
Dept: HOSPITAL 97 - ER | Age: 24
LOS: 1 days | Discharge: HOME | End: 2024-08-02
Payer: COMMERCIAL

## 2024-08-01 DIAGNOSIS — F41.0: Primary | ICD-10-CM

## 2024-08-01 PROCEDURE — 71045 X-RAY EXAM CHEST 1 VIEW: CPT

## 2024-08-01 PROCEDURE — 96374 THER/PROPH/DIAG INJ IV PUSH: CPT

## 2024-08-01 PROCEDURE — 85025 COMPLETE CBC W/AUTO DIFF WBC: CPT

## 2024-08-01 PROCEDURE — 80048 BASIC METABOLIC PNL TOTAL CA: CPT

## 2024-08-01 PROCEDURE — 99284 EMERGENCY DEPT VISIT MOD MDM: CPT

## 2024-08-01 PROCEDURE — 96375 TX/PRO/DX INJ NEW DRUG ADDON: CPT

## 2024-08-01 PROCEDURE — 85610 PROTHROMBIN TIME: CPT

## 2024-08-01 PROCEDURE — 36415 COLL VENOUS BLD VENIPUNCTURE: CPT

## 2024-08-01 PROCEDURE — 83735 ASSAY OF MAGNESIUM: CPT

## 2024-08-01 PROCEDURE — 83880 ASSAY OF NATRIURETIC PEPTIDE: CPT

## 2024-08-01 PROCEDURE — 84443 ASSAY THYROID STIM HORMONE: CPT

## 2024-08-01 PROCEDURE — 93005 ELECTROCARDIOGRAM TRACING: CPT

## 2024-08-01 PROCEDURE — 80076 HEPATIC FUNCTION PANEL: CPT

## 2024-08-01 PROCEDURE — 84484 ASSAY OF TROPONIN QUANT: CPT

## 2024-08-02 VITALS — TEMPERATURE: 98.2 F | SYSTOLIC BLOOD PRESSURE: 124 MMHG | DIASTOLIC BLOOD PRESSURE: 62 MMHG | OXYGEN SATURATION: 100 %

## 2024-08-02 LAB
ALBUMIN SERPL BCP-MCNC: 4 G/DL (ref 3.4–5)
ALBUMIN/GLOB SERPL: 1.4 {RATIO} (ref 1.1–1.8)
ALP SERPL-CCNC: 145 U/L (ref 45–117)
ALT SERPL W P-5'-P-CCNC: 25 U/L (ref 16–61)
ANION GAP SERPL CALC-SCNC: 7.2 MEQ/L (ref 5–15)
AST SERPL W P-5'-P-CCNC: 16 U/L (ref 15–37)
BILIRUB INDIRECT SERPL-MCNC: 0.4 MG/DL (ref 0.2–0.8)
BUN BLD-MCNC: 18 MG/DL (ref 7–18)
GLOBULIN SER CALC-MCNC: 2.9 G/DL (ref 2.3–3.5)
GLUCOSE SERPLBLD-MCNC: 82 MG/DL (ref 74–106)
HCT VFR BLD CALC: 42 % (ref 39.6–49)
HGB BLD-MCNC: 14.7 G/DL (ref 13.6–17.9)
INR BLD: 1.08
LYMPHOCYTES # SPEC AUTO: 1.6 K/UL (ref 0.7–4.9)
MAGNESIUM SERPL-MCNC: 2.2 MG/DL (ref 1.6–2.4)
MCH RBC QN AUTO: 32.2 PG (ref 27–35)
MCHC RBC AUTO-ENTMCNC: 35.1 G/DL (ref 32–36)
MCV RBC: 91.7 FL (ref 80–100)
NRBC # BLD: 0 10*3/UL (ref 0–0)
NRBC BLD AUTO-RTO: 0.2 % (ref 0–0)
NT-PROBNP SERPL-MCNC: 6 PG/ML (ref ?–125)
PMV BLD: 8.9 FL (ref 7.6–11.3)
POTASSIUM SERPL-SCNC: 3.2 MEQ/L (ref 3.5–5.1)
PROTHROMBIN TIME: 12.1 SECONDS (ref 9.4–12.5)
RBC # BLD: 4.58 M/UL (ref 4.33–5.43)
TROPONIN I SERPL HS-MCNC: 3.5 PG/ML (ref ?–58.9)
TSH SERPL DL<=0.05 MIU/L-ACNC: 3.69 UIU/ML (ref 0.36–3.74)
WBC # BLD AUTO: 5.4 THOU/UL (ref 4.3–10.9)

## 2024-08-02 NOTE — EKG
Test Date:    2024-08-02               Test Time:    03:56:09

Technician:   BAYLEE                                     

                                                     

MEASUREMENT RESULTS:                                       

Intervals:                                           

Rate:         86                                     

GA:           176                                    

QRSD:         100                                    

QT:           372                                    

QTc:          445                                    

Axis:                                                

P:            51                                     

GA:           176                                    

QRS:          85                                     

T:            50                                     

                                                     

INTERPRETIVE STATEMENTS:                                       

                                                     

Normal sinus rhythm

Normal ECG

Compared to ECG 02/20/2019 23:53:21

No significant changes



Electronically Signed On 08-02-24 13:28:40 CDT by Willy Hopkins

## 2024-08-02 NOTE — RAD REPORT
EXAM DESCRIPTION:  Chest Single View

[facility]: XR CHEST 1 VIEW

 

CLINICAL HISTORY:  23 years Male, CHEST PAIN

 

COMPARISON:  None.

 

FINDINGS:  Single portable AP upright view of the chest. Trachea is midline. Normal size of the cardi
ac silhouette. No pulmonary vascular congestion. No focal consolidation, pleural effusion, or pneumot
horax. No new osseous abnormality.

 

IMPRESSION:  No acute radiographic abnormality.

 

Electronically signed by:   Donya Macias MD   08/02/2024 01:43 AM CDT RP Workstation: [id number]

 

 

 

Due to temporary technical issues with the PACS/Fluency reporting system, reports are being signed by
 the in house radiologists without review as a courtesy to insure prompt reporting. The interpreting 
radiologist is fully responsible for the content of the report.

## 2024-08-02 NOTE — ER
Nurse's Notes                                                                                     

 [facility]                                                                 

Name: Qamar Lopez IV                                                                             

Age: 23 yrs                                                                                       

Sex: Male                                                                                         

: 2000                                                                                   

MRN: [id number]                                                                                   

Arrival Date: 2024                                                                          

Time: 23:43                                                                                       

Account#: [id number]                                                                            

Bed 18                                                                                            

Private MD:                                                                                       

Diagnosis: Anxiety disorder, unspecified;acute panic attack                                       

                                                                                                  

Presentation:                                                                                     

                                                                                             

23:48 Chief complaint: Patient states: Pt c/o sudden onset of chest pain, numbness/tingling   tl4 

      in both arms, and weakness/tightness in bilateral tricep muscles x 5-10 minutes.            

      Coronavirus screen: At this time, the client does not indicate any symptoms associated      

      with coronavirus-19. Ebola Screen: No symptoms or risks identified at this time.            

      Initial Sepsis Screen: Does the patient meet any 2 criteria? No. Patient's initial          

      sepsis screen is negative. Does the patient have a suspected source of infection? No.       

      Patient's initial sepsis screen is negative. Risk Assessment: Do you want to hurt           

      yourself or someone else? Patient reports no desire to harm self or others. Onset of        

      symptoms was 2024 at 23:35.                                                      

23:48 Method Of Arrival: Wheelchair                                                           tl4 

23:48 Acuity: ANKITA 3                                                                           tl4 

                                                                                                  

Triage Assessment:                                                                                

23:54 General: Appears distressed, Behavior is anxious. Pain: Complains of pain in chest.     tl4 

      EENT: No signs and/or symptoms were reported regarding the EENT system. Neuro: Level of     

      Consciousness is awake, alert, obeys commands, Oriented to person, place, time,             

      situation. Cardiovascular: Reports chest pain. Respiratory: Airway is patent                

      Respiratory effort is even, unlabored, Respiratory pattern is regular, symmetrical. GI:     

      No signs and/or symptoms were reported involving the gastrointestinal system. : No        

      signs and/or symptoms were reported regarding the genitourinary system. Derm: No signs      

      and/or symptoms reported regarding the dermatologic system. Musculoskeletal: No signs       

      and/or symptoms reported regarding the musculoskeletal system.                              

                                                                                                  

Historical:                                                                                       

- Allergies:                                                                                      

23:50 Amoxicillin;                                                                            tl4 

- Home Meds:                                                                                      

23:50 hydroxyzine HCl 25 mg Oral tab 1 tab PRN for Anxiety [Active]; pregabalin 100 mg oral   tl4 

      capsule daily [Active]; Adderall XR 15 mg oral Capsule, ER 24 hr daily [Active];            

      Cymbalta oral 90 mg daily [Active]; Auvelity  mg oral tablet,IR \T\ delayed           

      release,biphasic twice a day [Active];                                                      

- PMHx:                                                                                           

23:50 Fibromyalgia; Anxiety; Depressive disorder; chronic fatigue syndrome;                   tl4 

- PSHx:                                                                                           

23:50 Septoplasty; Adenoid excision; Tonsillectomy; Carpal Tunnel; UPPP;                      tl4 

                                                                                                  

- Immunization history:: Adult Immunizations unknown.                                             

- Infectious Disease History:: Denies.                                                            

- Social history:: Smoking status: Patient denies any tobacco usage or history of.                

- Family history:: not pertinent.                                                                 

                                                                                                  

                                                                                                  

Assessment:                                                                                       

                                                                                             

01:21 Pain: Complains of pain in chest. Neuro: No deficits noted. Cardiovascular: Reports     [facility]

      palpitations, Chest pain. Respiratory: No deficits noted. GI: No deficits noted. : No     

      deficits noted.                                                                             

03:50 General: Appears in no apparent distress. Behavior is calm, cooperative.                [facility]

                                                                                                  

Vital Signs:                                                                                      

                                                                                             

23:48  / 69; Pulse 114; Resp 18; Temp 97.6(TE); Pulse Ox 100% on R/A; Weight 79.83 kg;  tl4 

      Height 5 ft. 4 in. ; Pain 1/10;                                                             

                                                                                             

02:49  / 62; Pulse 89; Resp 14; Pulse Ox 95% ; Pain 0/10;                               [facility]

03:50 Pulse 84; Pulse Ox 95% ;                                                                [facility]

05:09  / 62; Pulse 62; Resp 14; Temp 98.2; Pulse Ox 100% ; Pain 0/10;                   12

                                                                                             

23:48 Body Mass Index 30.21 (79.83 kg, 162.56 cm)                                             tl4 

                                                                                             

23:48 Pain Scale: Adult                                                                       4 

                                                                                             

02:49 Pain Scale: Adult                                                                       [facility]

05:09 Pain Scale: Adult                                                                       [facility]

                                                                                                  

Valerie Coma Score:                                                                               

04:42 Eye Response: spontaneous(4). Motor Response: obeys commands(6). Verbal Response:       sp4 

      oriented(5). Total: 15.                                                                     

                                                                                                  

ED Course:                                                                                        

                                                                                             

23:45 Patient arrived in ED.                                                                  jj6 

23:46 Jose Luis Banks MD is Attending Physician.                                           sp4 

23:50 Triage completed.                                                                       tl4 

23:57 Arm band placed on right wrist.                                                         tl4 

                                                                                             

00:37 XRAY Chest (1 view) In Process Unspecified.                                             EDMS

01:22 Patient has correct armband on for positive identification. Bed in low position. Call   [facility]

      light in reach. Side rails up X 1.                                                          

01:39 Basic Metabolic Panel Sent.                                                             jm12

01:40 CBC with Diff Sent.                                                                     jm12

01:40 LFT's Sent.                                                                             jm12

01:40 Magnesium Sent.                                                                         jm12

01:40 NT PRO-BNP Sent.                                                                        jm12

01:40 PT-INR Sent.                                                                            jm12

01:40 Troponin HS Sent.                                                                       jm12

02:01 Inserted saline lock: 22 gauge in right antecubital area, using aseptic technique.      vk  

      Blood collected. Flushed with 10 mL NS.                                                     

02:02 Initial lab(s) drawn, by me, sent to lab.                                               vk  

04:13 EKG done, by ED staff.                                                                  vk  

05:09 IV discontinued, intact, bleeding controlled, No redness/swelling at site. Pressure     jm12

      dressing applied.                                                                           

                                                                                                  

Administered Medications:                                                                         

02:06 Drug: Aspirin PO Chewable Tablet 324 mg PO once; 81 mg tablets x 4 Route: PO;           12

02:06 Drug: Ativan IVP 2 mg IVP once Route: IVP; Site: right antecubital;                     [facility]

02:50 Follow up: Response: Marked relief of symptoms                                          [facility]

02:06 Drug: morphine IVP or IV 2 mg IVP once over 4 mins Route: IVP; Infused Over: 4 mins;    12

      Site: right antecubital;                                                                    

02:50 Follow up: Response: Marked relief of symptoms                                          [facility]

                                                                                                  

                                                                                                  

Outcome:                                                                                          

04:42 Discharge ordered by MD.                                                                sp4 

05:10 Discharged to home ambulatory,                                                          [facility]

05:10 Condition: stable                                                                           

05:10 Discharge instructions given to patient, family, Instructed on discharge instructions,      

      follow up and referral plans. medication usage, Demonstrated understanding of               

      instructions, follow-up care, medications, Prescriptions given X 1,                         

05:11 Patient left the ED.                                                                    jm12

                                                                                                  

Signatures:                                                                                       

Dispatcher [employer]                           EDMS                                                 

Kathie KatzjJose Luis Menchaca MD MD   sp4                                                  

Shay Kelley RN                       RN   [id number]                                                  

Estee Regalado Jessica, DEMARCUS                     RN   12                                                 

                                                                                                  

**************************************************************************************************

## 2024-08-02 NOTE — EDPHYS
Physician Documentation                                                                           

 [facility]                                                                 

Name: Qamar Lopez IV                                                                             

Age: 23 yrs                                                                                       

Sex: Male                                                                                         

: 2000                                                                                   

MRN: [id number]                                                                                   

Arrival Date: 2024                                                                          

Time: 23:43                                                                                       

Account#: [id number]                                                                            

Bed 18                                                                                            

Private MD:                                                                                       

ED Physician Jose Luis Banks                                                                    

HPI:                                                                                              

                                                                                             

23:46 This 23 yrs old  Male presents to ER via Unassigned with complaints of          sp4 

      Dizziness, Arm Pain, Shoulder Pain, Irregular Pulse, Chest Pain.                            

                                                                                             

04:42 23-year-old male with history of fibromyalgia anxiety and depression on Cymbalta and    sp4 

      Adderall, presents with acute onset chest pain and shortness of breath palpitations         

      dizziness..                                                                                 

                                                                                                  

Historical:                                                                                       

- Allergies:                                                                                      

                                                                                             

23:50 Amoxicillin;                                                                            tl4 

- Home Meds:                                                                                      

23:50 hydroxyzine HCl 25 mg Oral tab 1 tab PRN for Anxiety [Active]; pregabalin 100 mg oral   tl4 

      capsule daily [Active]; Adderall XR 15 mg oral Capsule, ER 24 hr daily [Active];            

      Cymbalta oral 90 mg daily [Active]; Auvelity  mg oral tablet,IR \T\ delayed           

      release,biphasic twice a day [Active];                                                      

- PMHx:                                                                                           

23:50 Fibromyalgia; Anxiety; Depressive disorder; chronic fatigue syndrome;                   tl4 

- PSHx:                                                                                           

23:50 Septoplasty; Adenoid excision; Tonsillectomy; Carpal Tunnel; UPPP;                      tl4 

                                                                                                  

- Immunization history:: Adult Immunizations unknown.                                             

- Infectious Disease History:: Denies.                                                            

- Social history:: Smoking status: Patient denies any tobacco usage or history of.                

- Family history:: not pertinent.                                                                 

                                                                                                  

                                                                                                  

ROS:                                                                                              

                                                                                             

04:42 Constitutional: Negative for fever, chills, and weight loss, positive chest pain,       sp4 

      dizziness, irregular pulse, anxiety                                                         

      All other systems are negative,                                                             

                                                                                                  

Exam:                                                                                             

04:42 Constitutional:  This is a well developed, well nourished patient who is awake, alert,  sp4 

      and in no acute distress. Head/Face:  Normocephalic, atraumatic. Eyes:  Pupils equal        

      round and reactive to light, extra-ocular motions intact.  Lids and lashes normal.          

      Conjunctiva and sclera are not injected.  Cornea within normal limits.  Periorbital         

      areas with no swelling, redness, or edema. ENT:  Nares patent. No nasal discharge, no       

      septal abnormalities noted.  Tympanic membranes are normal and external auditory canals     

      are clear.  Oropharynx with no redness, swelling, or masses, exudates, or evidence of       

      obstruction, uvula midline.  Mucous membranes moist. Neck:  Trachea midline, no             

      thyromegaly or masses palpated, and no cervical lymphadenopathy.  Supple, full range of     

      motion without nuchal rigidity, or vertebral point tenderness.  Chest/axilla:  Normal       

      chest wall appearance and motion.  Nontender with no deformity.  No lesions are             

      appreciated. Cardiovascular:  Regular rate and rhythm with a normal S1 and S2.  No          

      gallops, murmurs, or rubs.  Normal PMI, no JVD.  No pulse deficits. Respiratory:  Lungs     

      have equal breath sounds bilaterally, clear to auscultation and percussion.  No rales,      

      rhonchi or wheezes noted.  No increased work of breathing, no retractions or nasal          

      flaring. Abdomen/GI:  Soft,  with normal bowel sounds.  No distension or tympany.  No       

      guarding or rebound.  No evidence of tenderness throughout. Back:  No spinal                

      tenderness.  No costovertebral tenderness.     Skin:  Warm, dry with normal turgor.         

      Normal color with no rashes, no lesions, and no evidence of cellulitis. MS/ Extremity:      

      Pulses equal, no cyanosis.  Neurovascular intact.  Full, normal range of motion. Neuro:     

       Awake and alert, GCS 15, oriented to person, place, time, and situation.  Cranial          

      nerves II-XII grossly intact.  Motor strength 5/5 in all extremities.  Sensory grossly      

      intact.  Psych:  Awake, alert, with orientation to person, place and time.  Behavior,       

      mood, and affect are within normal limits                                                   

04:45 ECG was reviewed by the Attending Physician. EKG at 0 356 there is normal sinus rhythm  sp4 

      at the rate of 86                                                                           

                                                                                                  

Vital Signs:                                                                                      

                                                                                             

23:48  / 69; Pulse 114; Resp 18; Temp 97.6(TE); Pulse Ox 100% on R/A; Weight 79.83 kg;  tl4 

      Height 5 ft. 4 in. ; Pain 1/10;                                                             

                                                                                             

02:49  / 62; Pulse 89; Resp 14; Pulse Ox 95% ; Pain 0/10;                               jm12

03:50 Pulse 84; Pulse Ox 95% ;                                                                jm12

05:09  / 62; Pulse 62; Resp 14; Temp 98.2; Pulse Ox 100% ; Pain 0/10;                   jm12

                                                                                             

23:48 Body Mass Index 30.21 (79.83 kg, 162.56 cm)                                             4 

                                                                                             

23:48 Pain Scale: Adult                                                                       4 

                                                                                             

02:49 Pain Scale: Adult                                                                       jm12

05:09 Pain Scale: Adult                                                                       jm12

                                                                                                  

Louisa Coma Score:                                                                               

04:42 Eye Response: spontaneous(4). Motor Response: obeys commands(6). Verbal Response:       sp4 

      oriented(5). Total: 15.                                                                     

                                                                                                  

MDM:                                                                                              

                                                                                             

23:47 Patient medically screened.                                                             4 

                                                                                             

04:32 ED course: EXAM DESCRIPTION: Chest Single View RadLex: XR CHEST 1 VIEW CLINICAL         sp4 

      HISTORY: 23 years Male, CHEST PAIN COMPARISON: None. FINDINGS: Single portable AP           

      upright view of the chest. Trachea is midline. Normal size of the cardiac silhouette.       

      No pulmonary vascular congestion. No focal consolidation, pleural effusion, or              

      pneumothorax. No new osseous abnormality. IMPRESSION: No acute radiographic                 

      abnormality..                                                                               

04:45 Differential diagnosis: idiopathic dizziness, near-syncope, vertigo. Data reviewed:     4 

      vital signs, nurses notes. Data reviewed: lab test result(s), cardiac enzymes,              

      electrolytes, hepatic panel, EKG, radiologic studies, plain films.                          

04:46 ED course: Workup is unremarkable, patient feels much better after Ativan. Patient is   sp4 

      stable for discharge home.                                                                  

                                                                                                  

                                                                                             

23:46 Order name: Basic Metabolic Panel; Complete Time: 03:34                                 4 

                                                                                             

23:46 Order name: CBC with Diff; Complete Time: 03:34                                         4 

                                                                                             

23:46 Order name: LFT's; Complete Time: 03:34                                                 sp4 

                                                                                             

23:46 Order name: Magnesium; Complete Time: 03:34                                             4 

                                                                                             

23:46 Order name: NT PRO-BNP; Complete Time: 03:34                                            4 

                                                                                             

23:46 Order name: PT-INR; Complete Time: 03:34                                                4 

                                                                                             

23:46 Order name: Troponin HS; Complete Time: 03:34                                           4 

                                                                                             

23:47 Order name: TSH; Complete Time: 03:34                                                   4 

                                                                                             

23:46 Order name: XRAY Chest (1 view)                                                         [facility] 

                                                                                             

23:46 Order name: EKG; Complete Time: 23:47                                                   sp4 

                                                                                             

23:46 Order name: Cardiac monitoring; Complete Time: 01:39                                    sp4 

                                                                                             

23:46 Order name: EKG - Nurse/Tech; Complete Time: 01:16                                      sp4 

                                                                                             

23:46 Order name: IV Saline Lock; Complete Time: 01:39                                        sp4 

                                                                                             

23:46 Order name: Labs collected and sent; Complete Time: 01:39                               sp4 

                                                                                             

23:46 Order name: O2 Per Protocol; Complete Time: :22                                       sp4 

                                                                                             

23:46 Order name: O2 Sat Monitoring; Complete Time: :22                                     sp4 

                                                                                                  

EC:45 Rate is 86 beats/min. Rhythm is regular, Normal Sinus Rhythm. QRS Axis is Normal. OK    sp4 

      interval is normal. QRS interval is normal. QT interval is normal. No Q waves. T waves      

      are Normal. No ST changes noted. Clinical impression: Normal ECG. Interpreted by me.        

      Reviewed by me.                                                                             

                                                                                                  

Administered Medications:                                                                         

02:06 Drug: Aspirin PO Chewable Tablet 324 mg PO once; 81 mg tablets x 4 Route: PO;           [facility]

02:06 Drug: Ativan IVP 2 mg IVP once Route: IVP; Site: right antecubital;                     [facility]

02:50 Follow up: Response: Marked relief of symptoms                                          [facility]

02:06 Drug: morphine IVP or IV 2 mg IVP once over 4 mins Route: IVP; Infused Over: 4 mins;    [facility]

      Site: right antecubital;                                                                    

02:50 Follow up: Response: Marked relief of symptoms                                          [facility]

                                                                                                  

                                                                                                  

Disposition Summary:                                                                              

24 04:42                                                                                    

Discharge Ordered                                                                                 

 Notes:       Location: Home                                                                        
  sp4

      Problem: new                                                                            sp4 

      Symptoms: have improved                                                                 sp4 

      Condition: Stable                                                                       sp4 

      Diagnosis                                                                                   

        - Anxiety disorder, unspecified                                                       sp4 

        - acute panic attack                                                                  sp4 

      Followup:                                                                               sp4 

        - With: Private Physician                                                                  

        - When: 7 - 10 days                                                                        

        - Reason: Recheck today's complaints                                                       

      Discharge Instructions:                                                                     

        - Discharge Summary Sheet                                                             sp4 

        - Panic Attack                                                                        sp4 

      Forms:                                                                                      

        - Patient Portal Instructions                                                         sp4 

      Prescriptions:                                                                              

        - Ativan 1 mg Oral tablet                                                                  

            - take 1 tablet ORAL route once daily As needed PRN panic attacks; 12 tablet;     sp4 

      Refills: 0, Product Selection Permitted                                                     

Signatures:                                                                                       

[facility]                           Jose Luis Grimm MD MD   sp4                                                  

Shay Kelley RN                       RN   tl4                                                  

Markee, Tiesha, RN                     RN   jm12                                                 

                                                                                                  

************************************************************************************************** Statement Selected

## 2024-10-23 ENCOUNTER — HOSPITAL ENCOUNTER (EMERGENCY)
Dept: HOSPITAL 97 - ER | Age: 24
Discharge: HOME | End: 2024-10-23
Payer: COMMERCIAL

## 2024-10-23 VITALS — TEMPERATURE: 98.1 F

## 2024-10-23 VITALS — DIASTOLIC BLOOD PRESSURE: 77 MMHG | SYSTOLIC BLOOD PRESSURE: 120 MMHG | OXYGEN SATURATION: 99 %

## 2024-10-23 DIAGNOSIS — Z11.52: ICD-10-CM

## 2024-10-23 DIAGNOSIS — R07.9: Primary | ICD-10-CM

## 2024-10-23 DIAGNOSIS — R53.1: ICD-10-CM

## 2024-10-23 DIAGNOSIS — R06.4: ICD-10-CM

## 2024-10-23 DIAGNOSIS — F41.9: ICD-10-CM

## 2024-10-23 LAB
ALBUMIN SERPL BCP-MCNC: 3.8 G/DL (ref 3.4–5)
ALBUMIN/GLOB SERPL: 1.3 {RATIO} (ref 1.1–1.8)
ALP SERPL-CCNC: 193 U/L (ref 45–117)
ALT SERPL W P-5'-P-CCNC: 27 U/L (ref 16–61)
ANION GAP SERPL CALC-SCNC: 8.4 MEQ/L (ref 5–15)
AST SERPL W P-5'-P-CCNC: 14 U/L (ref 15–37)
BUN BLD-MCNC: 18 MG/DL (ref 7–18)
GLOBULIN SER CALC-MCNC: 2.9 G/DL (ref 2.3–3.5)
GLUCOSE SERPLBLD-MCNC: 101 MG/DL (ref 74–106)
HCT VFR BLD CALC: 47.1 % (ref 39.6–49)
HGB BLD-MCNC: 15.7 G/DL (ref 13.6–17.9)
LYMPHOCYTES # SPEC AUTO: 2.9 K/UL (ref 0.7–4.9)
MCH RBC QN AUTO: 31.7 PG (ref 27–35)
MCHC RBC AUTO-ENTMCNC: 33.3 G/DL (ref 32–36)
MCV RBC: 95.2 FL (ref 80–100)
NRBC # BLD: 0 10*3/UL (ref 0–0)
NRBC BLD AUTO-RTO: 0.1 % (ref 0–0)
PMV BLD: 9.2 FL (ref 7.6–11.3)
POTASSIUM SERPL-SCNC: 3.4 MEQ/L (ref 3.5–5.1)
RBC # BLD: 4.95 M/UL (ref 4.33–5.43)
SARS-COV+SARS-COV-2 AG RESP QL IA.RAPID: (no result)
TROPONIN I SERPL HS-MCNC: 3.9 PG/ML (ref ?–58.9)
WBC # BLD AUTO: 8.5 THOU/UL (ref 4.3–10.9)

## 2024-10-23 PROCEDURE — 87811 SARS-COV-2 COVID19 W/OPTIC: CPT

## 2024-10-23 PROCEDURE — 80053 COMPREHEN METABOLIC PANEL: CPT

## 2024-10-23 PROCEDURE — 84484 ASSAY OF TROPONIN QUANT: CPT

## 2024-10-23 PROCEDURE — 87070 CULTURE OTHR SPECIMN AEROBIC: CPT

## 2024-10-23 PROCEDURE — 87804 INFLUENZA ASSAY W/OPTIC: CPT

## 2024-10-23 PROCEDURE — 36415 COLL VENOUS BLD VENIPUNCTURE: CPT

## 2024-10-23 PROCEDURE — 85025 COMPLETE CBC W/AUTO DIFF WBC: CPT

## 2024-10-23 PROCEDURE — 71046 X-RAY EXAM CHEST 2 VIEWS: CPT

## 2024-10-23 PROCEDURE — 87081 CULTURE SCREEN ONLY: CPT

## 2024-10-23 PROCEDURE — 93005 ELECTROCARDIOGRAM TRACING: CPT

## 2024-10-23 NOTE — RAD REPORT
EXAMINATION: TWO VIEW CHEST XR



CLINICAL INDICATION: Male, 24 years old. [facility] MAIN

 COUGH

 Bed Name: 



TECHNIQUE: 2 view radiographs of the chest were performed. 



COMPARISON: 8/2/2024



FINDINGS:

The lungs are well inflated and clear. No pneumothorax or sizable effusion. The heart is normal in si
ze. Mediastinal contours are unremarkable.



IMPRESSION:

 

No acute or significant abnormalities.



Reported By: Osmar Fink 

Electronically Signed:  10/23/2024 11:09 AM

## 2024-10-23 NOTE — EDPHYS
Physician Documentation                                                                           

 [facility]                                                                 

Name: Qamar Lopez IV                                                                             

Age: 24 yrs                                                                                       

Sex: Male                                                                                         

: 2000                                                                                   

MRN: [id number]                                                                                   

Arrival Date: 10/23/2024                                                                          

Time: 07:15                                                                                       

Account#: [id number]                                                                            

Bed 16                                                                                            

Private MD:                                                                                       

ED Physician Alok Albright                                                                      

HPI:                                                                                              

10/23                                                                                             

09:17 This 24 yrs old  Male presents to ER via Ambulatory with complaints of Chest    autumn 

      Pain, Numbness Of Face, Doesn't Feel Right.                                                 

09:17 The patient or guardian reports chest pain that is located primarily in the substernal  autumn 

      area. The pain does not radiate. Associated signs and symptoms: Pertinent positives:        

      cough. The chest pain is described as aching. Severity of pain: At its worst the pain       

      was mild in the emergency department the pain is unchanged. The patient has experienced     

      similar episodes in the past, a few times.                                                  

                                                                                                  

Historical:                                                                                       

- Allergies:                                                                                      

07:39 Amoxicillin;                                                                            iw  

- PMHx:                                                                                           

07:39 Anxiety; Chronic fatigue syndrome; depressive disorder; Fibromyalgia;                   iw  

- PSHx:                                                                                           

07:39 Adenoid excision; carpal tunnel; septoplasty; Tonsillectomy; UPPP;                      iw  

                                                                                                  

- Immunization history:: Adult Immunizations not up to date.                                      

- Infectious Disease History:: Denies.                                                            

- Social history:: Smoking status: Patient denies any tobacco usage or history of.                

- Family history:: not pertinent.                                                                 

                                                                                                  

                                                                                                  

ROS:                                                                                              

09:17 Constitutional: Negative for fever, chills, and weight loss, Eyes: Negative for injury, autumn 

      pain, redness, and discharge, ENT: Negative for injury, pain, and discharge, Neck:          

      Negative for injury, pain, and swelling, Cardiovascular: Negative for chest pain,           

      palpitations, and edema, Respiratory: Negative for shortness of breath, cough,              

      wheezing, and pleuritic chest pain, Abdomen/GI: Negative for abdominal pain, nausea,        

      vomiting, diarrhea, and constipation, Back: Negative for injury and pain, : Negative      

      for injury, bleeding, discharge, and swelling, MS/Extremity: Negative for injury and        

      deformity, Skin: Negative for injury, rash, and discoloration, Psych: Negative for          

      depression, anxiety, suicide ideation, homicidal ideation, and hallucinations,              

      Allergy/Immunology: Negative for hives, rash, and allergies, Endocrine: Negative for        

      neck swelling, polydipsia, polyuria, polyphagia, and marked weight changes,                 

      Hematologic/Lymphatic: Negative for swollen nodes, abnormal bleeding, and unusual           

      bruising,                                                                                   

09:17 Neuro: Positive for weakness,                                                               

                                                                                                  

Exam:                                                                                             

09:17 Constitutional:  This is a well developed, well nourished patient who is awake, alert,  autumn 

      and in no acute distress. Head/Face:  Normocephalic, atraumatic. Eyes:  Pupils equal        

      round and reactive to light, extra-ocular motions intact.  Lids and lashes normal.          

      Conjunctiva and sclera are non-icteric and not injected.  Cornea within normal limits.      

      Periorbital areas with no swelling, redness, or edema. ENT:  Nares patent. No nasal         

      discharge, no septal abnormalities noted.  Tympanic membranes are normal and external       

      auditory canals are clear.  Oropharynx with no redness, swelling, or masses, exudates,      

      or evidence of obstruction, uvula midline.  Mucous membranes moist. Neck:  Trachea          

      midline, no thyromegaly or masses palpated, and no cervical lymphadenopathy.  Supple,       

      full range of motion without nuchal rigidity, or vertebral point tenderness.  No            

      Meningismus. Chest/axilla:  Normal chest wall appearance and motion.  Nontender with no     

      deformity.  No lesions are appreciated. Cardiovascular:  Regular rate and rhythm with a     

      normal S1 and S2.  No gallops, murmurs, or rubs.  Normal PMI, no JVD.  No pulse             

      deficits. Respiratory:  Lungs have equal breath sounds bilaterally, clear to                

      auscultation and percussion.  No rales, rhonchi or wheezes noted.  No increased work of     

      breathing, no retractions or nasal flaring. Abdomen/GI:  Soft, non-tender, with normal      

      bowel sounds.  No distension or tympany.  No guarding or rebound.  No evidence of           

      tenderness throughout. Back:  No spinal tenderness.  No costovertebral tenderness.          

      Full range of motion. Male :  Normal genitalia with no discharge or lesions. Skin:        

      Warm, dry with normal turgor.  Normal color with no rashes, no lesions, and no evidence     

      of cellulitis. MS/ Extremity:  Pulses equal, no cyanosis.  Neurovascular intact.  Full,     

      normal range of motion. Neuro:  Awake and alert, GCS 15, oriented to person, place,         

      time, and situation.  Cranial nerves II-XII grossly intact.  Motor strength 5/5 in all      

      extremities.  Sensory grossly intact.  Cerebellar exam normal.  Normal gait. Psych:         

      Awake, alert, with orientation to person, place and time.  Behavior, mood, and affect       

      are within normal limits.                                                                   

09:17 ECG was reviewed by the Attending Physician.                                                

09:22 Musculoskeletal/extremity: DVT Exam: No signs of deep vein thrombosis. no pain, no      autumn 

      swelling, no tenderness, negative Homans' sign noted on exam, no appreciated bluish         

      discoloration, no erythema, no increased warmth,                                            

                                                                                                  

Vital Signs:                                                                                      

07:34  / 74; Pulse 96; Resp 16; Temp 98.1; Pulse Ox 97% on R/A; Weight 74.84 kg; Height iw  

      5 ft. 4 in. ;                                                                               

09:14  / 77; Pulse 80; Resp 17; Pulse Ox 99% on R/A;                                    rs5 

09:40  / 74; Pulse 77; Resp 17; Pulse Ox 99% on R/A;                                    rs5 

07:34 Body Mass Index 28.32 (74.84 kg, 162.56 cm)                                             iw  

                                                                                                  

MDM:                                                                                              

07:19 Medical Screening Exam initiated                                                        autumn 

09:19 Differential diagnosis: abnormal EKG, acute myocardial infarction, acute pericarditis,  autumn 

      anxiety, chest wall pain, cholecystitis, Cholelithiasis costochondritis, esophagitis,       

      hiatal hernia, pancreatitis, peptic ulcer disease, pericarditis, pleurisy, pneumonia,       

      pulmonary embolus, stable angina, thoracic aortic disection, unstable angina. HEART         

      Score: History: Slightly Suspicious (0), ECG: Normal (0), Age: < or = 45 years (0).         

      DILIP Risk Score: TOTAL SCORE = 0. Data reviewed: vital signs, nurses notes, lab test        

      result(s), EKG. Consideration of Admission/Observation Escalation of care including         

      admission/observation considered. I considered the following discharge prescriptions or     

      medication management in the emergency department Medications were administered in the      

      Emergency Department. See MAR. Independent interpretation of the following test(s) in       

      the Emergency Department EKG: See my EKG interpretation above. Test considered but Not      

      performed: CT: NO CT BRAIN. Historians other than the Patient: Parent: DAD WELL             

      INFORMED. Care significantly affected by the following chronic conditions: ANXIRTY,         

      CHRONIC FATIGUE, FIBROMYALGIA. Counseling: I had a detailed discussion with the patient     

      and/or guardian regarding the historical points, exam findings, and any diagnostic          

      results supporting the discharge/admit diagnosis, lab results, radiology results, the       

      need for outpatient follow up, for definitive care, a family practitioner.                  

                                                                                                  

10/23                                                                                             

08:33 Order name: CBC with Diff; Complete Time: 09:16                                         [facility] [place] 

10/23                                                                                             

08:33 Order name: Comprehensive Metabolic Panel; Complete Time: 09:16                         [facility] [place] 

10/23                                                                                             

08:33 Order name: Flu                                                                         [facility] [place] 

10/23                                                                                             

08:33 Order name: Strep                                                                       [facility] [place] 

10/23                                                                                             

08:33 Order name: SARS RAPID; Complete Time: 09:16                                            [facility] [place] 

10/23                                                                                             

08:33 Order name: Troponin High Sensitivity; Complete Time: 09:16                             [facility] [place] 

10/23                                                                                             

09:08 Order name: Throat Culture                                                              EDMS

10/23                                                                                             

08:33 Order name: Chest Pa And Lat (2 Views) XRAY                                             [facility] [place] 

10/23                                                                                             

07:19 Order name: EKG; Complete Time: 07:20                                                   [facility] [place] 

10/23                                                                                             

07:19 Order name: EKG - Nurse/Tech; Complete Time: 07:41                                      [facility] [place] 

                                                                                                  

EC:17 Rate is 85 beats/min. Rhythm is regular. QRS Axis is Normal. DE interval is normal. QRS autumn 

      interval is normal. QT interval is normal. No Q waves. T waves are Normal. No ST            

      changes noted. Clinical impression: Normal ECG and No evidence of ischemia. Interpreted     

      by me. Reviewed by me.                                                                      

                                                                                                  

Administered Medications:                                                                         

08:50 Drug: NS 0.9% IV 1000 ml IV at 1000 ml once; to be given as a bolus over 60 minutes     rs5 

      Route: IV; Rate: 1000 ml; Site: left antecubital;                                           

09:40 Follow up: IV Status: Completed infusion; IV Intake: 1000ml                             rs5 

                                                                                                  

                                                                                                  

Disposition Summary:                                                                              

10/23/24 09:23                                                                                    

Discharge Ordered                                                                                 

 Notes:       Location: Home                                                                        
  autumn

      Problem: new                                                                            autumn 

      Symptoms: have improved                                                                 autumn 

      Condition: Stable                                                                       autumn 

      Diagnosis                                                                                   

        - Chest pain, unspecified                                                             autumn 

        - Hyperventilation                                                                    autumn 

        - Anxiety disorder, unspecified                                                       autumn 

      Followup:                                                                               autumn 

        - With: Private Physician                                                                  

        - When: 2 - 3 days                                                                         

        - Reason: Recheck today's complaints, Re-evaluation by your physician                      

      Discharge Instructions:                                                                     

        - Discharge Summary Sheet                                                             autumn 

        - Panic Attack                                                                        autumn 

        - Nonspecific Chest Pain, Adult                                                       autumn 

        - Nonspecific Chest Pain, Adult, Easy-to-Read                                         autumn 

        - Panic Attack, Easy-to-Read                                                          autumn 

        - Aspirin and Your Heart                                                              autumn 

        - Supporting Someone With Anxiety                                                     autumn 

        - Managing Anxiety, Adult                                                             autumn 

      Forms:                                                                                      

        - Medication Reconciliation Form                                                      autumn 

        - Antibiotic Education                                                                autumn 

        - Prescription Opioid Use                                                             autumn 

        - Patient Portal Instructions                                                         autumn 

        - Leadership Thank You Letter                                                         autumn 

      Prescriptions:                                                                              

        - Hydroxyzine HCl 25 mg Oral Tablet                                                        

            - take 1 tablet ORAL route every 6 hours As needed; 30 tablet; Refills: 0,        [facility] [place] 

      Product Selection Permitted                                                                 

        - Motrin  mg Oral tablet                                                             

            - take 2 tablet ORAL route every 6 hours As needed as needed with food; 30        autumn 

      tablet; Refills: 0, Product Selection Permitted                                             

Signatures:                                                                                       

Dispatcher [employer]                           EDMS                                                 

Alok Albright MD MD cha Williams, Irene, RN RN   iw                                                   

Andrew Zarate RN                       RN   rs5                                                  

                                                                                                  

Corrections: (The following items were deleted from the chart)                                    

08:34 08:34 CBC+H.LAB.BRZ ordered. EDMS                                                       EDMS

08:34 08:34 COMPREHENSIVE METABOLIC PANEL+C.LAB.BRZ ordered. EDMS                             EDMS

08:34 08:34 Influenza Screen (A \T\ B)+BA.LAB.BRZ ordered. EDMS                                 EDMS

08:34 08:34 Group A Streptococcus Rapid Sc+BA.LAB.BRZ ordered. EDMS                           EDMS

08:34 08:34 SARS-COV-2 Antigen Rapid+I.LAB.BRZ ordered. EDMS                                  EDMS

08:34 08:34 Troponin High Sensitivity+C.LAB.BRZ ordered. EDMS                                 EDMS

                                                                                                  

**************************************************************************************************

## 2024-10-23 NOTE — ER
Nurse's Notes                                                                                     

 [facility]                                                                 

Name: Qamar Lopez IV                                                                             

Age: 24 yrs                                                                                       

Sex: Male                                                                                         

: 2000                                                                                   

MRN: [id number]                                                                                   

Arrival Date: 10/23/2024                                                                          

Time: 07:15                                                                                       

Account#: [id number]                                                                            

Bed 16                                                                                            

Private MD:                                                                                       

Diagnosis: Chest pain, unspecified;Hyperventilation;Anxiety disorder, unspecified                 

                                                                                                  

Presentation:                                                                                     

10/23                                                                                             

07:34 Chief complaint: Patient states: 5 hours ago he felt ill, his head was swimming and he  iw  

      had trouble with his balance . felt pressure in top of head, numbness in tongue, had a      

      warm sensation throughout his body. Coronavirus screen: At this time, the client does       

      not indicate any symptoms associated with coronavirus-19. Ebola Screen: No symptoms or      

      risks identified at this time. Initial Sepsis Screen: Does the patient meet any 2           

      criteria? No. Patient's initial sepsis screen is negative. Does the patient have a          

      suspected source of infection? No. Patient's initial sepsis screen is negative. Risk        

      Assessment: Do you want to hurt yourself or someone else? Patient reports no desire to      

      harm self or others. Onset of symptoms was 2024.                                

07:34 Method Of Arrival: Ambulatory                                                           iw  

07:34 Acuity: ANKITA 3                                                                           iw  

                                                                                                  

Historical:                                                                                       

- Allergies:                                                                                      

07:39 Amoxicillin;                                                                            iw  

- PMHx:                                                                                           

07:39 Anxiety; Chronic fatigue syndrome; depressive disorder; Fibromyalgia;                   iw  

- PSHx:                                                                                           

07:39 Adenoid excision; carpal tunnel; septoplasty; Tonsillectomy; UPPP;                      iw  

                                                                                                  

- Immunization history:: Adult Immunizations not up to date.                                      

- Infectious Disease History:: Denies.                                                            

- Social history:: Smoking status: Patient denies any tobacco usage or history of.                

- Family history:: not pertinent.                                                                 

                                                                                                  

                                                                                                  

Screenin:20 [facility] ED Fall Risk Assessment (Adult) History of falling in the last 3 months,       rs5 

      including since admission No falls in past 3 months (0 pts) Confusion or Disorientation     

      No (0 pts) Intoxicated or Sedated No (0 pts) Impaired Gait No (0 pts) Mobility Assist       

      Device Used No (0 pt) Altered Elimination No (0 pt) Score/Fall Risk Level 0 - 2 = Low       

      Risk Oriented to surroundings, Maintained a safe environment. Abuse screen: Denies          

      threats or abuse. Nutritional screening: No deficits noted. Tuberculosis screening: No      

      symptoms or risk factors identified.                                                        

                                                                                                  

Assessment:                                                                                       

07:20 General: Appears in no apparent distress. uncomfortable, Behavior is cooperative,       rs5 

      anxious. Pain: Complains of pain in chest Pain does not radiate. Pain currently is 2        

      out of 10 on a pain scale. Quality of pain is described as aching, Pain began Is            

      continuous.                                                                                 

07:20 Neuro: Level of Consciousness is awake, alert, obeys commands, Oriented to person,      rs5 

      place, time, situation, Reports numbness in face. Cardiovascular: Patient's skin is         

      warm and dry. Respiratory: Airway is patent Respiratory effort is even, unlabored,          

      Respiratory pattern is regular, symmetrical. GI: Abdomen is round non-distended. : No     

      signs and/or symptoms were reported regarding the genitourinary system. EENT: No signs      

      and/or symptoms were reported regarding the EENT system. Derm: Skin is intact, Skin is      

      pink, warm \T\ dry. Musculoskeletal: Range of motion: intact in all extremities.            

08:31 Reassessment: Patient and/or family updated on plan of care and expected duration. Pain rs5 

      level reassessed. Patient is alert, oriented x 3, equal unlabored respirations, skin        

      warm/dry/pink.                                                                              

09:47 Reassessment: Patient and/or family updated on plan of care and expected duration. Pain rs5 

      level reassessed. Patient is alert, oriented x 3, equal unlabored respirations, skin        

      warm/dry/pink. Patient states feeling better. Patient states symptoms have improved.        

                                                                                                  

Vital Signs:                                                                                      

07:34  / 74; Pulse 96; Resp 16; Temp 98.1; Pulse Ox 97% on R/A; Weight 74.84 kg; Height iw  

      5 ft. 4 in. ;                                                                               

09:14  / 77; Pulse 80; Resp 17; Pulse Ox 99% on R/A;                                    rs5 

09:40  / 74; Pulse 77; Resp 17; Pulse Ox 99% on R/A;                                    rs5 

07:34 Body Mass Index 28.32 (74.84 kg, 162.56 cm)                                               

                                                                                                  

ED Course:                                                                                        

07:18 Patient arrived in ED.                                                                  im  

07:19 Alok Albright MD is Attending Physician.                                             autumn 

07:20 Patient has correct armband on for positive identification. Placed in gown. Bed in low  rs5 

      position. Call light in reach. Side rails up X2. Client placed on continuous cardiac        

      and pulse oximetry monitoring. NIBP monitoring applied. Cardiac monitor on.                 

07:30 No provider procedures requiring assistance completed. Inserted saline lock: 20 gauge   rs5 

      in left antecubital area, using aseptic technique. Blood collected. Flushed with 10 mL      

      NS. Patient maintains SpO2 saturation greater than 95% on room air.                         

07:39 Triage completed.                                                                       iw  

07:39 Arm band placed on.                                                                     iw  

07:41 Andrew Zarate, RN is Primary Nurse.                                                     rs5 

09:47 Chest Pa And Lat (2 Views) XRAY In Process Unspecified.                                 EDMS

09:48 Provided Education on: discharge instructions .                                         rs5 

09:50 IV discontinued, intact, bleeding controlled, No redness/swelling at site. Pressure     rs5 

      dressing applied.                                                                           

                                                                                                  

Administered Medications:                                                                         

08:50 Drug: NS 0.9% IV 1000 ml IV at 1000 ml once; to be given as a bolus over 60 minutes     rs5 

      Route: IV; Rate: 1000 ml; Site: left antecubital;                                           

09:40 Follow up: IV Status: Completed infusion; IV Intake: 1000ml                             rs5 

                                                                                                  

                                                                                                  

Medication:                                                                                       

09:14 VIS not applicable for this client.                                                     rs5 

                                                                                                  

Intake:                                                                                           

09:40 IV: 1000ml; Total: 1000ml.                                                              rs5 

                                                                                                  

Outcome:                                                                                          

09:23 Discharge ordered by MD.                                                                autumn 

09:50 Discharged to home ambulatory,                                                          rs5 

09:50 Condition: stable                                                                       rs5 

09:50 Discharge instructions given to patient, family, Instructed on discharge instructions,      

      follow up and referral plans. medication usage, Demonstrated understanding of               

      instructions, follow-up care, medications, Prescriptions given X 2,                         

09:52 Patient left the ED.                                                                    rs5 

                                                                                                  

Signatures:                                                                                       

Dispatcher [employer]                           Alok Solis MD MD cha Williams, Irene, RN                     DEMARCUS                                                      

Andrew Zarate, RN                       RN   rs5                                                  

Kinza Rogers                                                   

                                                                                                  

**************************************************************************************************

## 2024-10-24 NOTE — EKG
Test Date:    2024-10-23               Test Time:    07:31:33

Technician:   GISELLE                                     

                                                     

MEASUREMENT RESULTS:                                       

Intervals:                                           

Rate:         85                                     

MI:           162                                    

QRSD:         88                                     

QT:           352                                    

QTc:          418                                    

Axis:                                                

P:            70                                     

MI:           162                                    

QRS:          94                                     

T:            64                                     

                                                     

INTERPRETIVE STATEMENTS:                                       

                                                     

Normal sinus rhythm

Rightward axis

Borderline ECG

Compared to ECG 08/02/2024 03:56:09

Right-axis deviation now present



Electronically Signed On 10-24-24 12:15:35 CDT by Camilo Niño